# Patient Record
Sex: MALE | HISPANIC OR LATINO | Employment: FULL TIME | URBAN - METROPOLITAN AREA
[De-identification: names, ages, dates, MRNs, and addresses within clinical notes are randomized per-mention and may not be internally consistent; named-entity substitution may affect disease eponyms.]

---

## 2023-06-21 ENCOUNTER — HOSPITAL ENCOUNTER (EMERGENCY)
Facility: CLINIC | Age: 38
Discharge: HOME | End: 2023-06-22
Attending: EMERGENCY MEDICINE | Admitting: EMERGENCY MEDICINE

## 2023-06-21 VITALS
HEART RATE: 97 BPM | SYSTOLIC BLOOD PRESSURE: 113 MMHG | DIASTOLIC BLOOD PRESSURE: 83 MMHG | RESPIRATION RATE: 18 BRPM | OXYGEN SATURATION: 97 % | TEMPERATURE: 97.5 F

## 2023-06-21 DIAGNOSIS — N47.6 BALANOPOSTHITIS: ICD-10-CM

## 2023-06-21 PROCEDURE — 87591 N.GONORRHOEAE DNA AMP PROB: CPT | Performed by: EMERGENCY MEDICINE

## 2023-06-21 PROCEDURE — 99283 EMERGENCY DEPT VISIT LOW MDM: CPT

## 2023-06-21 PROCEDURE — 81001 URINALYSIS AUTO W/SCOPE: CPT | Performed by: EMERGENCY MEDICINE

## 2023-06-21 PROCEDURE — 87491 CHLMYD TRACH DNA AMP PROBE: CPT | Performed by: EMERGENCY MEDICINE

## 2023-06-22 LAB
ALBUMIN UR-MCNC: NEGATIVE MG/DL
APPEARANCE UR: CLEAR
BILIRUB UR QL STRIP: NEGATIVE
C TRACH DNA SPEC QL NAA+PROBE: NEGATIVE
COLOR UR AUTO: ABNORMAL
GLUCOSE UR STRIP-MCNC: >=1000 MG/DL
HGB UR QL STRIP: NEGATIVE
KETONES UR STRIP-MCNC: NEGATIVE MG/DL
LEUKOCYTE ESTERASE UR QL STRIP: NEGATIVE
MUCOUS THREADS #/AREA URNS LPF: PRESENT /LPF
N GONORRHOEA DNA SPEC QL NAA+PROBE: NEGATIVE
NITRATE UR QL: NEGATIVE
PH UR STRIP: 5 [PH] (ref 5–7)
RBC URINE: <1 /HPF
SP GR UR STRIP: 1.03 (ref 1–1.03)
SQUAMOUS EPITHELIAL: <1 /HPF
UROBILINOGEN UR STRIP-MCNC: NORMAL MG/DL
WBC URINE: <1 /HPF

## 2023-06-22 RX ORDER — CLOTRIMAZOLE 1 %
CREAM (GRAM) TOPICAL 2 TIMES DAILY
Qty: 14 G | Refills: 0 | Status: SHIPPED | OUTPATIENT
Start: 2023-06-22 | End: 2023-07-06

## 2023-06-22 ASSESSMENT — ACTIVITIES OF DAILY LIVING (ADL): ADLS_ACUITY_SCORE: 33

## 2023-06-22 NOTE — ED PROVIDER NOTES
History     Chief Complaint:  Penis/Scrotum Problem       A  was used (German).      Scottie Alex is an otherwise healthy 38 year old male who presents to the ED with two months of pain around the head of his penis and foreskin. Patient states that this pain began after he started having sexual intercourse with his girlfriend. He adds that the pain is worsened during and after intercourse. Scottie describes the pain as feeling like there are several small cuts on his penis and says he thinks it may be related to a vein. He endorses pain with urination as well, but he is able to urinate.      Independent Historian:   None - Patient Only    Review of External Notes:   N/A       Medications:    The patient is not currently taking any prescribed medications.    Past Medical History:    The patient denies any significant past medical history.    Physical Exam     Patient Vitals for the past 24 hrs:   BP Temp Temp src Pulse Resp SpO2   06/21/23 2308 113/83 97.5  F (36.4  C) Temporal 97 18 97 %        Physical Exam  Vitals and nursing note reviewed.   Constitutional:       General: He is not in acute distress.     Appearance: Normal appearance. He is not ill-appearing.   HENT:      Head: Normocephalic and atraumatic.      Right Ear: External ear normal.      Left Ear: External ear normal.      Nose: Nose normal.      Mouth/Throat:      Mouth: Mucous membranes are moist.   Eyes:      Extraocular Movements: Extraocular movements intact.      Conjunctiva/sclera: Conjunctivae normal.   Pulmonary:      Effort: Pulmonary effort is normal. No respiratory distress.   Genitourinary:     Penis: Uncircumcised. No phimosis or paraphimosis.       Comments: Several small superficial fissures are noted along the distal foreskin with associated tenderness and mild surrounding erythema.  Patient is able to fully retract the foreskin.  Musculoskeletal:         General: No deformity or signs of  injury.      Cervical back: Normal range of motion and neck supple.   Skin:     General: Skin is warm and dry.      Findings: No rash.   Neurological:      Mental Status: He is alert and oriented to person, place, and time.   Psychiatric:         Behavior: Behavior normal.           Emergency Department Course     Laboratory:  Labs Ordered and Resulted from Time of ED Arrival to Time of ED Departure   ROUTINE UA WITH MICROSCOPIC - Abnormal       Result Value    Color Urine Straw      Appearance Urine Clear      Glucose Urine >=1000 (*)     Bilirubin Urine Negative      Ketones Urine Negative      Specific Gravity Urine 1.035      Blood Urine Negative      pH Urine 5.0      Protein Albumin Urine Negative      Urobilinogen Urine Normal      Nitrite Urine Negative      Leukocyte Esterase Urine Negative      Mucus Urine Present (*)     RBC Urine <1      WBC Urine <1      Squamous Epithelials Urine <1     NEISSERIA GONORRHOEAE PCR   CHLAMYDIA TRACHOMATIS PCR        Emergency Department Course & Assessments:    Assessments:  0033 I obtained history and examined the patient as noted above.    Social Determinants of Health affecting care:   None    Disposition:  The patient was discharged to home.     Impression & Plan      Medical Decision Makin-year-old male presenting with pain and irritation along the distal foreskin.  Suspect that this is balanoposthitis.  Is unclear if this is an infectious etiology versus inflammatory or allergic or some sort of dermatologic etiology.  He is still able to urinate and he has no phimosis or paraphimosis.  We will try topical clotrimazole and I will refer to urology for further evaluation.  We discussed return precautions, especially if phimosis or paraphimosis should develop or if he has difficulty urinating.      Diagnosis:    ICD-10-CM    1. 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   6/22/2023   Louie Vieyra MD Goodwin, Shaun M, MD  06/22/23 0215

## 2023-06-22 NOTE — ED TRIAGE NOTES
Arrives from home. States the he had sex with his girlfriend, states he now has pain around his penis. States feels like a a lot of little cuts. Also states it is painful when he urinates. States pain is on the head of his penis.

## 2023-06-27 ENCOUNTER — OFFICE VISIT (OUTPATIENT)
Dept: UROLOGY | Facility: CLINIC | Age: 38
End: 2023-06-27

## 2023-06-27 VITALS
SYSTOLIC BLOOD PRESSURE: 132 MMHG | HEART RATE: 109 BPM | HEIGHT: 69 IN | BODY MASS INDEX: 24.44 KG/M2 | WEIGHT: 165 LBS | DIASTOLIC BLOOD PRESSURE: 72 MMHG

## 2023-06-27 DIAGNOSIS — N48.89 PENILE PAIN: Primary | ICD-10-CM

## 2023-06-27 DIAGNOSIS — N47.8 REDUNDANT PREPUCE AND PHIMOSIS: ICD-10-CM

## 2023-06-27 DIAGNOSIS — R81 GLUCOSE FOUND IN URINE ON EXAMINATION: ICD-10-CM

## 2023-06-27 DIAGNOSIS — N47.1 REDUNDANT PREPUCE AND PHIMOSIS: ICD-10-CM

## 2023-06-27 LAB
ALBUMIN UR-MCNC: NEGATIVE MG/DL
APPEARANCE UR: CLEAR
BILIRUB UR QL STRIP: NEGATIVE
COLOR UR AUTO: YELLOW
GLUCOSE UR STRIP-MCNC: >=1000 MG/DL
HGB UR QL STRIP: ABNORMAL
KETONES UR STRIP-MCNC: NEGATIVE MG/DL
LEUKOCYTE ESTERASE UR QL STRIP: NEGATIVE
NITRATE UR QL: NEGATIVE
PH UR STRIP: 5.5 [PH] (ref 5–7)
SP GR UR STRIP: 1.01 (ref 1–1.03)
UROBILINOGEN UR STRIP-ACNC: 0.2 E.U./DL

## 2023-06-27 PROCEDURE — 81003 URINALYSIS AUTO W/O SCOPE: CPT | Mod: QW | Performed by: PHYSICIAN ASSISTANT

## 2023-06-27 PROCEDURE — 99203 OFFICE O/P NEW LOW 30 MIN: CPT | Mod: 25 | Performed by: PHYSICIAN ASSISTANT

## 2023-06-27 PROCEDURE — 51798 US URINE CAPACITY MEASURE: CPT | Performed by: PHYSICIAN ASSISTANT

## 2023-06-27 RX ORDER — NYSTATIN AND TRIAMCINOLONE ACETONIDE 100000; 1 [USP'U]/G; MG/G
CREAM TOPICAL 2 TIMES DAILY
Qty: 30 G | Refills: 1 | Status: SHIPPED | OUTPATIENT
Start: 2023-06-27 | End: 2024-01-10

## 2023-06-27 ASSESSMENT — ENCOUNTER SYMPTOMS
FEVER: 0
VOMITING: 0
SHORTNESS OF BREATH: 0
FREQUENCY: 0
DYSURIA: 1
HEMATURIA: 0
CHILLS: 0
NAUSEA: 0

## 2023-06-27 ASSESSMENT — PAIN SCALES - GENERAL: PAINLEVEL: NO PAIN (0)

## 2023-06-27 NOTE — PATIENT INSTRUCTIONS
Will start with a trial of Mycolog for possible yeast and tight foreskin.  Use twice daily to the lesions and foreskin.  If is too uncomfortable, would recommend a trial of betamethasone ointment.    Stop clotrimazole when you start Mycolog.    Would recommend that you meet with a surgeon about possible circumcision and review of the penis skin in about 2 weeks.    Will plan on having you get a hemoglobin A1c to look for diabetes on the way out.    Contact us in the interim with questions, concerns, or changes in symptomatology.  [phone]

## 2023-06-27 NOTE — PROGRESS NOTES
Subjective      REQUESTING PROVIDER   Referred Self     Visit is conducted with the aid of a Uzbek iPad .    REASON FOR CONSULT   Penile soreness    HISTORY OF PRESENT ILLNESS   Mr. Alex is very pleasant 38 year old year old male, who presents today for further evaluation recommendations regarding pain at the tip of his penis and foreskin.  Patient was seen in the emergency department on 06/21/2023.  There was concern for balanoposthitis.  He was started on Lotrimin twice daily to the penis.    Patient denies hematuria, urgency, frequency, or urinary incontinence.  He feels like he empties his bladder completely.  He does note that his penis hurts with sexual intercourse as well as when he is trying to retract his foreskin.  There are is broken skin.  He is uncircumcised and notes that it is very difficult to retract the foreskin, and it gets stuck.  He does feel like the Lotrimin helped his symptoms little.    He also notes dysuria when the area of the penis is open and he is trying to urinate.    Patient was tested for gonorrhea and chlamydia.  These were both negative.  Urinalysis in the ER as well as today both shows glucose >1000.  Patient denies a diagnosis of diabetes.      The following portions of the patient's history were reviewed and updated as appropriate: allergies, current medications, past family history, past medical history, past social history, past surgical history and problem list.     REVIEW OF SYSTEMS   Review of Systems   Constitutional: Negative for chills and fever.   Respiratory: Negative for shortness of breath.    Cardiovascular: Negative for chest pain.   Gastrointestinal: Negative for nausea and vomiting.   Genitourinary: Positive for dysuria (If lesions open.), genital sores (Lesion, cracked skin.) and penile pain. Negative for frequency, hematuria and urgency.      Per HPI.     There is no problem list on file for this patient.     No past medical history on file.  "  No past surgical history on file.     Social History:   Getting .     Objective      PHYSICAL EXAM   /72   Pulse 109   Ht 1.753 m (5' 9\")   Wt 74.8 kg (165 lb)   BMI 24.37 kg/m     Physical Exam  Constitutional:       Appearance: Normal appearance.   HENT:      Head: Normocephalic.      Nose: Nose normal.   Eyes:      General: No scleral icterus.  Pulmonary:      Effort: Pulmonary effort is normal.   Abdominal:      General: There is no distension.   Genitourinary:     Comments: Uncircumcised phallus.  Longitudinal fissures along the foreskin with attempted retraction of the glans.  These are bleeding and tender.  Unable to completely retract the foreskin around the glans.  No palpable scrotal or epididymal masses bilaterally.  Cremaster reflex intact bilaterally.  Musculoskeletal:         General: Normal range of motion.      Cervical back: Normal range of motion.   Skin:     General: Skin is warm and dry.   Neurological:      General: No focal deficit present.      Mental Status: He is alert and oriented to person, place, and time.   Psychiatric:         Mood and Affect: Mood normal.         Behavior: Behavior normal.          LABORATORY     Recent Labs   Lab Test 06/27/23  1339 06/21/23  2331   COLOR Yellow Straw   APPEARANCE Clear Clear   URINEGLC >=1000* >=1000*   URINEBILI Negative Negative   URINEKETONE Negative Negative   SG 1.010 1.035   UBLD Trace* Negative   URINEPH 5.5 5.0   PROTEIN Negative Negative   UROBILINOGEN 0.2  --    NITRITE Negative Negative   LEUKEST Negative Negative   RBCU  --  <1   WBCU  --  <1       GC: Both negative.    TESTING    PVR: 11 mL    Assessment & Plan    1. Penile pain    2. Redundant prepuce and phimosis    3. Glucose found in urine on examination        I had the pleasure today of meeting with Mr. Alex to discuss the ulceration/fingers that are longitudinal on the inside of his glans when attempting to retract the foreskin.  It appears that he is " having some degree of phimosis with poorly stretching skin.  Patient does have some flaking of the skin as well.  He has noted some mild improvement with the Lotrimin.    We will plan on treating him with Mycolog twice daily to the area.  This has a steroid as well as a antifungal.  Patient will continue to try to stretch this.  If this is too irritative, would recommend a trial of betamethasone twice daily to the area.    I discussed with the patient that I am concerned that he may need a circumcision in the future.  Would recommend close follow-up with repeat examination and visit with one of our urologist to discuss this.  Patient voiced understanding.    We also discussed that his 2 recent urinalysis do show glucosuria.  I do have some concern about possible diagnosis of diabetes.  This would increase his risk of surgical intervention as well as if it is poorly controlled risk for infection.    Have recommended patient get a hemoglobin A1c at the lab on his way out to look for concern for diabetes.  We discussed long-term considerations for untreated diabetes.  Patient is very young.    Signed by:     Crystal Yeung PA-C 6/27/2023 1:36 PM

## 2023-06-27 NOTE — LETTER
6/27/2023       RE: Scottie Alex  [address]  [address]  [facility] [address]     Dear Colleague,    Thank you for referring your patient, Scottie Alex, to the [facility] [place] [facility]. Please see a copy of my visit note below.    Subjective      REQUESTING PROVIDER   Referred Self     Visit is conducted with the aid of a Romanian iPad .    REASON FOR CONSULT   Penile soreness    HISTORY OF PRESENT ILLNESS   Mr. Alex is very pleasant 38 year old year old male, who presents today for further evaluation recommendations regarding pain at the tip of his penis and foreskin.  Patient was seen in the emergency department on 06/21/2023.  There was concern for balanoposthitis.  He was started on Lotrimin twice daily to the penis.    Patient denies hematuria, urgency, frequency, or urinary incontinence.  He feels like he empties his bladder completely.  He does note that his penis hurts with sexual intercourse as well as when he is trying to retract his foreskin.  There are is broken skin.  He is uncircumcised and notes that it is very difficult to retract the foreskin, and it gets stuck.  He does feel like the Lotrimin helped his symptoms little.    He also notes dysuria when the area of the penis is open and he is trying to urinate.    Patient was tested for gonorrhea and chlamydia.  These were both negative.  Urinalysis in the ER as well as today both shows glucose >1000.  Patient denies a diagnosis of diabetes.      The following portions of the patient's history were reviewed and updated as appropriate: allergies, current medications, past family history, past medical history, past social history, past surgical history and problem list.     REVIEW OF SYSTEMS   Review of Systems   Constitutional: Negative for chills and fever.   Respiratory: Negative for shortness of breath.   "  Cardiovascular: Negative for chest pain.   Gastrointestinal: Negative for nausea and vomiting.   Genitourinary: Positive for dysuria (If lesions open.), genital sores (Lesion, cracked skin.) and penile pain. Negative for frequency, hematuria and urgency.      Per HPI.     There is no problem list on file for this patient.     No past medical history on file.   No past surgical history on file.     Social History:   Getting .     Objective      PHYSICAL EXAM   /72   Pulse 109   Ht 1.753 m (5' 9\")   Wt 74.8 kg (165 lb)   BMI 24.37 kg/m     Physical Exam  Constitutional:       Appearance: Normal appearance.   HENT:      Head: Normocephalic.      Nose: Nose normal.   Eyes:      General: No scleral icterus.  Pulmonary:      Effort: Pulmonary effort is normal.   Abdominal:      General: There is no distension.   Genitourinary:     Comments: Uncircumcised phallus.  Longitudinal fissures along the foreskin with attempted retraction of the glans.  These are bleeding and tender.  Unable to completely retract the foreskin around the glans.  No palpable scrotal or epididymal masses bilaterally.  Cremaster reflex intact bilaterally.  Musculoskeletal:         General: Normal range of motion.      Cervical back: Normal range of motion.   Skin:     General: Skin is warm and dry.   Neurological:      General: No focal deficit present.      Mental Status: He is alert and oriented to person, place, and time.   Psychiatric:         Mood and Affect: Mood normal.         Behavior: Behavior normal.          LABORATORY     Recent Labs   Lab Test 06/27/23  1339 06/21/23  2331   COLOR Yellow Straw   APPEARANCE Clear Clear   URINEGLC >=1000* >=1000*   URINEBILI Negative Negative   URINEKETONE Negative Negative   SG 1.010 1.035   UBLD Trace* Negative   URINEPH 5.5 5.0   PROTEIN Negative Negative   UROBILINOGEN 0.2  --    NITRITE Negative Negative   LEUKEST Negative Negative   RBCU  --  <1   WBCU  --  <1       GC: Both " negative.    TESTING    PVR: 11 mL    Assessment & Plan    1. Penile pain    2. Redundant prepuce and phimosis    3. Glucose found in urine on examination        I had the pleasure today of meeting with Mr. Alex to discuss the ulceration/fingers that are longitudinal on the inside of his glans when attempting to retract the foreskin.  It appears that he is having some degree of phimosis with poorly stretching skin.  Patient does have some flaking of the skin as well.  He has noted some mild improvement with the Lotrimin.    We will plan on treating him with Mycolog twice daily to the area.  This has a steroid as well as a antifungal.  Patient will continue to try to stretch this.  If this is too irritative, would recommend a trial of betamethasone twice daily to the area.    I discussed with the patient that I am concerned that he may need a circumcision in the future.  Would recommend close follow-up with repeat examination and visit with one of our urologist to discuss this.  Patient voiced understanding.    We also discussed that his 2 recent urinalysis do show glucosuria.  I do have some concern about possible diagnosis of diabetes.  This would increase his risk of surgical intervention as well as if it is poorly controlled risk for infection.    Have recommended patient get a hemoglobin A1c at the lab on his way out to look for concern for diabetes.  We discussed long-term considerations for untreated diabetes.  Patient is very young.    Signed by:     Crystal Yeung PA-C 6/27/2023 1:36 PM

## 2023-11-14 DIAGNOSIS — N48.89 PENILE PAIN: ICD-10-CM

## 2023-11-15 RX ORDER — NYSTATIN AND TRIAMCINOLONE ACETONIDE 100000; 1 [USP'U]/G; MG/G
CREAM TOPICAL 2 TIMES DAILY
Qty: 30 G | Refills: 1 | OUTPATIENT
Start: 2023-11-15

## 2023-11-15 NOTE — TELEPHONE ENCOUNTER
He should see a physician if he is still having issues. Ie needing the cream. I recommend close follow up and he no showed his appointment with Haley.  I think he might need a circumcision given how tight it was.    Crystal Yeung PA-C

## 2023-11-29 ENCOUNTER — OFFICE VISIT (OUTPATIENT)
Dept: UROLOGY | Facility: CLINIC | Age: 38
End: 2023-11-29

## 2023-11-29 VITALS
WEIGHT: 170 LBS | HEIGHT: 69 IN | DIASTOLIC BLOOD PRESSURE: 75 MMHG | BODY MASS INDEX: 25.18 KG/M2 | SYSTOLIC BLOOD PRESSURE: 112 MMHG | HEART RATE: 94 BPM

## 2023-11-29 DIAGNOSIS — N47.1 PHIMOSIS: Primary | ICD-10-CM

## 2023-11-29 PROCEDURE — 99214 OFFICE O/P EST MOD 30 MIN: CPT | Performed by: UROLOGY

## 2023-11-29 RX ORDER — CEFAZOLIN SODIUM 2 G/50ML
2 SOLUTION INTRAVENOUS SEE ADMIN INSTRUCTIONS
Status: CANCELLED | OUTPATIENT
Start: 2023-11-29

## 2023-11-29 RX ORDER — CEFAZOLIN SODIUM 2 G/50ML
2 SOLUTION INTRAVENOUS
Status: CANCELLED | OUTPATIENT
Start: 2023-11-29

## 2023-11-29 ASSESSMENT — PAIN SCALES - GENERAL: PAINLEVEL: NO PAIN (0)

## 2023-11-29 NOTE — LETTER
11/29/2023       RE: Scottie Alex  [address]  [facility] [address]     Dear Colleague,    Thank you for referring your patient, Scottie Alex, to [facility]. Please see a copy of my visit note below.    Office Visit Note  [facility]  [phone]    UROLOGIC DIAGNOSES:   Phimosis and balanitis    CURRENT INTERVENTIONS:   Steroid cream    HISTORY:   This is a 38-year-old gentleman who has had balanitis and difficulty retracting the foreskin for some time.  He has been using nystatin and steroid cream for the past 4 to 5 months and says he has perhaps seen a little improvement but not enough.  He says he still has difficulty retracting the foreskin, and that retract the foreskin is quite painful.  He continues to be quite bothered by this.      PAST MEDICAL HISTORY: History reviewed. No pertinent past medical history.    PAST SURGICAL HISTORY: History reviewed. No pertinent surgical history.    FAMILY HISTORY: History reviewed. No pertinent family history.    SOCIAL HISTORY:   Social History     Socioeconomic History    Marital status:      Spouse name: None    Number of children: None    Years of education: None    Highest education level: None   Tobacco Use    Smoking status: Every Day     Types: Cigarettes    Smokeless tobacco: Never       Review Of Systems:  Skin: No rash, pruritis, or skin pigmentation  Eyes: No changes in vision  Ears/Nose/Throat: No changes in hearing, no nosebleeds  Respiratory: No shortness of breath, dyspnea on exertion, cough, or hemoptysis  Cardiovascular: No chest pain or palpitations  Gastrointestinal: No diarrhea or constipation. No abdominal pain. No hematochezia  Genitourinary: see HPI  Musculoskeletal: No pain or swelling of joints, normal range of motion  Neurologic: No weakness or tremors  Psychiatric: No  "recent changes in memory or mood  Hematologic/Lymphatic/Immunologic: No easy bruising or enlarged lymph nodes  Endocrine: No weight gain or loss      PHYSICAL EXAM:    /75   Pulse 94   Ht 1.753 m (5' 9\")   Wt 77.1 kg (170 lb)   BMI 25.10 kg/m      Constitutional: Well developed. Conversant and in no acute distress  Eyes: Anicteric sclera, conjunctiva clear, normal extraocular movements  ENT: Normocephalic and atraumatic,   Skin: Warm and dry. No rashes or lesions  Cardiac: Heart regular rate and rhythm without murmurs rubs or gallops.  No peripheral edema  Back/Flank: Not done  CNS/PNS: Normal musculature and movements, moves all extremities normally  Respiratory: Lungs clear to auscultation bilaterally.  Normal non-labored breathing  Abdomen: Soft nontender and nondistended  Peripheral Vascular: No peripheral edema  Mental Status/Psych: Alert and Oriented x 3. Normal mood and affect    Penis: He is uncircumcised.  I was able to pull back foreskin, but it is painful for the patient.  The patient has a ring of foreskin that is quite cracked and scarred and inflamed.  He has a tethered penile frenulum as well.  Scrotal skin: Normal, no lesions  Testicles: Normal to palpation bilaterally  Epididymis: Normal to palpation bilaterally  Lymphatic: Normal inguinal lymph nodes    Digital Rectal Exam:     Cystoscopy: Not done    Imaging: None    Urinalysis: UA RESULTS:  Recent Labs   Lab Test 06/27/23  1339 06/21/23  2331   COLOR Yellow Straw   APPEARANCE Clear Clear   URINEGLC >=1000* >=1000*   URINEBILI Negative Negative   URINEKETONE Negative Negative   SG 1.010 1.035   UBLD Trace* Negative   URINEPH 5.5 5.0   PROTEIN Negative Negative   UROBILINOGEN 0.2  --    NITRITE Negative Negative   LEUKEST Negative Negative   RBCU  --  <1   WBCU  --  <1       PSA:     Post Void Residual:     Other labs: None today      IMPRESSION:  Phimosis    PLAN:  He has mild phimosis.  I am still able to retract the foreskin.  However, " the foreskin itself is quite cracked and irritated and it is painful for him to retract the foreskin.  We discussed options of continuing with steroid cream versus proceeding with circumcision.  At this time he wishes to proceed with circumcision.  We discussed circumcision in detail today along with its risks and expected recovery.  All of his questions were answered and he wishes to proceed.  Will get him scheduled for circumcision as an outpatient in the near future.        Chris Joyner M.D.

## 2023-11-29 NOTE — NURSING NOTE
Chief Complaint   Patient presents with    Phimosis      Pt states he has difficulty pulling back foreskin to clean, pt states this is painful.  Pt states he has been through 2 tubes of cream, but this has not helped    Akilah Quigley CMA

## 2023-11-29 NOTE — PROGRESS NOTES
"Office Visit Note  [facility]  [phone]    UROLOGIC DIAGNOSES:   Phimosis and balanitis    CURRENT INTERVENTIONS:   Steroid cream    HISTORY:   This is a 38-year-old gentleman who has had balanitis and difficulty retracting the foreskin for some time.  He has been using nystatin and steroid cream for the past 4 to 5 months and says he has perhaps seen a little improvement but not enough.  He says he still has difficulty retracting the foreskin, and that retract the foreskin is quite painful.  He continues to be quite bothered by this.      PAST MEDICAL HISTORY: History reviewed. No pertinent past medical history.    PAST SURGICAL HISTORY: History reviewed. No pertinent surgical history.    FAMILY HISTORY: History reviewed. No pertinent family history.    SOCIAL HISTORY:   Social History     Socioeconomic History    Marital status:      Spouse name: None    Number of children: None    Years of education: None    Highest education level: None   Tobacco Use    Smoking status: Every Day     Types: Cigarettes    Smokeless tobacco: Never       Review Of Systems:  Skin: No rash, pruritis, or skin pigmentation  Eyes: No changes in vision  Ears/Nose/Throat: No changes in hearing, no nosebleeds  Respiratory: No shortness of breath, dyspnea on exertion, cough, or hemoptysis  Cardiovascular: No chest pain or palpitations  Gastrointestinal: No diarrhea or constipation. No abdominal pain. No hematochezia  Genitourinary: see HPI  Musculoskeletal: No pain or swelling of joints, normal range of motion  Neurologic: No weakness or tremors  Psychiatric: No recent changes in memory or mood  Hematologic/Lymphatic/Immunologic: No easy bruising or enlarged lymph nodes  Endocrine: No weight gain or loss      PHYSICAL EXAM:    /75   Pulse 94   Ht 1.753 m (5' 9\")   Wt 77.1 kg (170 lb)   BMI 25.10 kg/m      Constitutional: Well developed. Conversant and in no acute distress  Eyes: Anicteric sclera, " conjunctiva clear, normal extraocular movements  ENT: Normocephalic and atraumatic,   Skin: Warm and dry. No rashes or lesions  Cardiac: Heart regular rate and rhythm without murmurs rubs or gallops.  No peripheral edema  Back/Flank: Not done  CNS/PNS: Normal musculature and movements, moves all extremities normally  Respiratory: Lungs clear to auscultation bilaterally.  Normal non-labored breathing  Abdomen: Soft nontender and nondistended  Peripheral Vascular: No peripheral edema  Mental Status/Psych: Alert and Oriented x 3. Normal mood and affect    Penis: He is uncircumcised.  I was able to pull back foreskin, but it is painful for the patient.  The patient has a ring of foreskin that is quite cracked and scarred and inflamed.  He has a tethered penile frenulum as well.  Scrotal skin: Normal, no lesions  Testicles: Normal to palpation bilaterally  Epididymis: Normal to palpation bilaterally  Lymphatic: Normal inguinal lymph nodes    Digital Rectal Exam:     Cystoscopy: Not done    Imaging: None    Urinalysis: UA RESULTS:  Recent Labs   Lab Test 06/27/23  1339 06/21/23  2331   COLOR Yellow Straw   APPEARANCE Clear Clear   URINEGLC >=1000* >=1000*   URINEBILI Negative Negative   URINEKETONE Negative Negative   SG 1.010 1.035   UBLD Trace* Negative   URINEPH 5.5 5.0   PROTEIN Negative Negative   UROBILINOGEN 0.2  --    NITRITE Negative Negative   LEUKEST Negative Negative   RBCU  --  <1   WBCU  --  <1       PSA:     Post Void Residual:     Other labs: None today      IMPRESSION:  Phimosis    PLAN:  He has mild phimosis.  I am still able to retract the foreskin.  However, the foreskin itself is quite cracked and irritated and it is painful for him to retract the foreskin.  We discussed options of continuing with steroid cream versus proceeding with circumcision.  At this time he wishes to proceed with circumcision.  We discussed circumcision in detail today along with its risks and expected recovery.  All of his  questions were answered and he wishes to proceed.  Will get him scheduled for circumcision as an outpatient in the near future.        Chris Joyner M.D.

## 2023-11-30 ENCOUNTER — TELEPHONE (OUTPATIENT)
Dept: UROLOGY | Facility: CLINIC | Age: 38
End: 2023-11-30

## 2023-11-30 NOTE — TELEPHONE ENCOUNTER
Scheduled surgery for pt with Dr. Joyner 11/27. Went through instructions when he was in the office yesterday, sent him home with packet. Dr. Joyner said he already did the pre-op in the clinic, so no need to schedule pre-op.

## 2024-01-09 ENCOUNTER — APPOINTMENT (OUTPATIENT)
Dept: INTERPRETER SERVICES | Facility: CLINIC | Age: 39
End: 2024-01-09

## 2024-01-10 ENCOUNTER — APPOINTMENT (OUTPATIENT)
Dept: INTERPRETER SERVICES | Facility: CLINIC | Age: 39
End: 2024-01-10

## 2024-01-17 ENCOUNTER — ANESTHESIA (OUTPATIENT)
Dept: SURGERY | Facility: CLINIC | Age: 39
End: 2024-01-17

## 2024-01-17 ENCOUNTER — HOSPITAL ENCOUNTER (OUTPATIENT)
Facility: CLINIC | Age: 39
Discharge: HOME | End: 2024-01-17
Attending: UROLOGY | Admitting: UROLOGY

## 2024-01-17 ENCOUNTER — ANESTHESIA EVENT (OUTPATIENT)
Dept: SURGERY | Facility: CLINIC | Age: 39
End: 2024-01-17

## 2024-01-17 VITALS
TEMPERATURE: 98 F | WEIGHT: 152.5 LBS | HEART RATE: 86 BPM | BODY MASS INDEX: 22.52 KG/M2 | DIASTOLIC BLOOD PRESSURE: 67 MMHG | RESPIRATION RATE: 15 BRPM | SYSTOLIC BLOOD PRESSURE: 114 MMHG | OXYGEN SATURATION: 99 %

## 2024-01-17 DIAGNOSIS — N47.1 PHIMOSIS: Primary | ICD-10-CM

## 2024-01-17 PROCEDURE — 250N000011 HC RX IP 250 OP 636: Performed by: UROLOGY

## 2024-01-17 PROCEDURE — 258N000003 HC RX IP 258 OP 636: Performed by: NURSE ANESTHETIST, CERTIFIED REGISTERED

## 2024-01-17 PROCEDURE — 272N000001 HC OR GENERAL SUPPLY STERILE: Performed by: UROLOGY

## 2024-01-17 PROCEDURE — 250N000009 HC RX 250: Performed by: UROLOGY

## 2024-01-17 PROCEDURE — 710N000012 HC RECOVERY PHASE 2, PER MINUTE: Performed by: UROLOGY

## 2024-01-17 PROCEDURE — 250N000011 HC RX IP 250 OP 636: Performed by: NURSE ANESTHETIST, CERTIFIED REGISTERED

## 2024-01-17 PROCEDURE — 370N000017 HC ANESTHESIA TECHNICAL FEE, PER MIN: Performed by: UROLOGY

## 2024-01-17 PROCEDURE — 999N000141 HC STATISTIC PRE-PROCEDURE NURSING ASSESSMENT: Performed by: UROLOGY

## 2024-01-17 PROCEDURE — 88304 TISSUE EXAM BY PATHOLOGIST: CPT | Mod: 26 | Performed by: PATHOLOGY

## 2024-01-17 PROCEDURE — 88304 TISSUE EXAM BY PATHOLOGIST: CPT | Mod: TC | Performed by: UROLOGY

## 2024-01-17 PROCEDURE — 360N000076 HC SURGERY LEVEL 3, PER MIN: Performed by: UROLOGY

## 2024-01-17 RX ORDER — GINSENG 100 MG
CAPSULE ORAL PRN
Status: DISCONTINUED | OUTPATIENT
Start: 2024-01-17 | End: 2024-01-17 | Stop reason: HOSPADM

## 2024-01-17 RX ORDER — CEFAZOLIN SODIUM/WATER 2 G/20 ML
2 SYRINGE (ML) INTRAVENOUS SEE ADMIN INSTRUCTIONS
Status: DISCONTINUED | OUTPATIENT
Start: 2024-01-17 | End: 2024-01-17 | Stop reason: HOSPADM

## 2024-01-17 RX ORDER — ONDANSETRON 4 MG/1
4 TABLET, ORALLY DISINTEGRATING ORAL EVERY 30 MIN PRN
Status: DISCONTINUED | OUTPATIENT
Start: 2024-01-17 | End: 2024-01-17 | Stop reason: HOSPADM

## 2024-01-17 RX ORDER — BUPIVACAINE HYDROCHLORIDE 5 MG/ML
INJECTION, SOLUTION EPIDURAL; INTRACAUDAL PRN
Status: DISCONTINUED | OUTPATIENT
Start: 2024-01-17 | End: 2024-01-17 | Stop reason: HOSPADM

## 2024-01-17 RX ORDER — OXYCODONE HYDROCHLORIDE 5 MG/1
10 TABLET ORAL
Status: DISCONTINUED | OUTPATIENT
Start: 2024-01-17 | End: 2024-01-17 | Stop reason: HOSPADM

## 2024-01-17 RX ORDER — FENTANYL CITRATE 50 UG/ML
INJECTION, SOLUTION INTRAMUSCULAR; INTRAVENOUS PRN
Status: DISCONTINUED | OUTPATIENT
Start: 2024-01-17 | End: 2024-01-17

## 2024-01-17 RX ORDER — CEFAZOLIN SODIUM/WATER 2 G/20 ML
2 SYRINGE (ML) INTRAVENOUS
Status: DISCONTINUED | OUTPATIENT
Start: 2024-01-17 | End: 2024-01-17 | Stop reason: HOSPADM

## 2024-01-17 RX ORDER — ONDANSETRON 2 MG/ML
INJECTION INTRAMUSCULAR; INTRAVENOUS PRN
Status: DISCONTINUED | OUTPATIENT
Start: 2024-01-17 | End: 2024-01-17

## 2024-01-17 RX ORDER — FENTANYL CITRATE 50 UG/ML
25 INJECTION, SOLUTION INTRAMUSCULAR; INTRAVENOUS
Status: DISCONTINUED | OUTPATIENT
Start: 2024-01-17 | End: 2024-01-17 | Stop reason: HOSPADM

## 2024-01-17 RX ORDER — SODIUM CHLORIDE, SODIUM LACTATE, POTASSIUM CHLORIDE, CALCIUM CHLORIDE 600; 310; 30; 20 MG/100ML; MG/100ML; MG/100ML; MG/100ML
INJECTION, SOLUTION INTRAVENOUS CONTINUOUS PRN
Status: DISCONTINUED | OUTPATIENT
Start: 2024-01-17 | End: 2024-01-17

## 2024-01-17 RX ORDER — ONDANSETRON 2 MG/ML
4 INJECTION INTRAMUSCULAR; INTRAVENOUS EVERY 30 MIN PRN
Status: DISCONTINUED | OUTPATIENT
Start: 2024-01-17 | End: 2024-01-17 | Stop reason: HOSPADM

## 2024-01-17 RX ORDER — NALOXONE HYDROCHLORIDE 0.4 MG/ML
0.4 INJECTION, SOLUTION INTRAMUSCULAR; INTRAVENOUS; SUBCUTANEOUS
Status: DISCONTINUED | OUTPATIENT
Start: 2024-01-17 | End: 2024-01-17 | Stop reason: HOSPADM

## 2024-01-17 RX ORDER — KETOROLAC TROMETHAMINE 30 MG/ML
INJECTION, SOLUTION INTRAMUSCULAR; INTRAVENOUS PRN
Status: DISCONTINUED | OUTPATIENT
Start: 2024-01-17 | End: 2024-01-17

## 2024-01-17 RX ORDER — PROPOFOL 10 MG/ML
INJECTION, EMULSION INTRAVENOUS CONTINUOUS PRN
Status: DISCONTINUED | OUTPATIENT
Start: 2024-01-17 | End: 2024-01-17

## 2024-01-17 RX ORDER — HYDROCODONE BITARTRATE AND ACETAMINOPHEN 5; 325 MG/1; MG/1
1 TABLET ORAL EVERY 6 HOURS PRN
Qty: 5 TABLET | Refills: 0 | Status: SHIPPED | OUTPATIENT
Start: 2024-01-17

## 2024-01-17 RX ORDER — CEFAZOLIN SODIUM 1 G/3ML
INJECTION, POWDER, FOR SOLUTION INTRAMUSCULAR; INTRAVENOUS PRN
Status: DISCONTINUED | OUTPATIENT
Start: 2024-01-17 | End: 2024-01-17

## 2024-01-17 RX ORDER — NALOXONE HYDROCHLORIDE 0.4 MG/ML
0.2 INJECTION, SOLUTION INTRAMUSCULAR; INTRAVENOUS; SUBCUTANEOUS
Status: DISCONTINUED | OUTPATIENT
Start: 2024-01-17 | End: 2024-01-17 | Stop reason: HOSPADM

## 2024-01-17 RX ORDER — DEXAMETHASONE SODIUM PHOSPHATE 4 MG/ML
INJECTION, SOLUTION INTRA-ARTICULAR; INTRALESIONAL; INTRAMUSCULAR; INTRAVENOUS; SOFT TISSUE PRN
Status: DISCONTINUED | OUTPATIENT
Start: 2024-01-17 | End: 2024-01-17

## 2024-01-17 RX ORDER — OXYCODONE HYDROCHLORIDE 5 MG/1
5 TABLET ORAL
Status: DISCONTINUED | OUTPATIENT
Start: 2024-01-17 | End: 2024-01-17 | Stop reason: HOSPADM

## 2024-01-17 RX ADMIN — ONDANSETRON 4 MG: 2 INJECTION INTRAMUSCULAR; INTRAVENOUS at 12:23

## 2024-01-17 RX ADMIN — PHENYLEPHRINE HYDROCHLORIDE 100 MCG: 10 INJECTION INTRAVENOUS at 12:42

## 2024-01-17 RX ADMIN — PROPOFOL 150 MCG/KG/MIN: 10 INJECTION, EMULSION INTRAVENOUS at 12:08

## 2024-01-17 RX ADMIN — MIDAZOLAM 2 MG: 1 INJECTION INTRAMUSCULAR; INTRAVENOUS at 12:08

## 2024-01-17 RX ADMIN — FENTANYL CITRATE 25 MCG: 50 INJECTION INTRAMUSCULAR; INTRAVENOUS at 12:18

## 2024-01-17 RX ADMIN — FENTANYL CITRATE 25 MCG: 50 INJECTION INTRAMUSCULAR; INTRAVENOUS at 12:12

## 2024-01-17 RX ADMIN — DEXAMETHASONE SODIUM PHOSPHATE 4 MG: 4 INJECTION, SOLUTION INTRA-ARTICULAR; INTRALESIONAL; INTRAMUSCULAR; INTRAVENOUS; SOFT TISSUE at 12:23

## 2024-01-17 RX ADMIN — CEFAZOLIN 2 G: 1 INJECTION, POWDER, FOR SOLUTION INTRAMUSCULAR; INTRAVENOUS at 12:08

## 2024-01-17 RX ADMIN — PHENYLEPHRINE HYDROCHLORIDE 100 MCG: 10 INJECTION INTRAVENOUS at 12:45

## 2024-01-17 RX ADMIN — FENTANYL CITRATE 50 MCG: 50 INJECTION INTRAMUSCULAR; INTRAVENOUS at 12:30

## 2024-01-17 RX ADMIN — SODIUM CHLORIDE, POTASSIUM CHLORIDE, SODIUM LACTATE AND CALCIUM CHLORIDE: 600; 310; 30; 20 INJECTION, SOLUTION INTRAVENOUS at 12:08

## 2024-01-17 RX ADMIN — KETOROLAC TROMETHAMINE 15 MG: 30 INJECTION, SOLUTION INTRAMUSCULAR at 12:48

## 2024-01-17 ASSESSMENT — ACTIVITIES OF DAILY LIVING (ADL)
ADLS_ACUITY_SCORE: 29

## 2024-01-17 ASSESSMENT — LIFESTYLE VARIABLES: TOBACCO_USE: 1

## 2024-01-17 NOTE — ANESTHESIA CARE TRANSFER NOTE
Patient: Scottie Alex    Procedure: Procedure(s):  Circumcision       Diagnosis: Phimosis [N47.1]  Diagnosis Additional Information: No value filed.    Anesthesia Type:   MAC     Note:    Oropharynx: oropharynx clear of all foreign objects and spontaneously breathing  Level of Consciousness: awake  Oxygen Supplementation: room air    Independent Airway: airway patency satisfactory and stable  Dentition: dentition unchanged  Vital Signs Stable: post-procedure vital signs reviewed and stable  Report to RN Given: handoff report given  Patient transferred to: Phase II    Handoff Report: Identifed the Patient, Identified the Reponsible Provider, Reviewed the pertinent medical history, Discussed the surgical course, Reviewed Intra-OP anesthesia mangement and issues during anesthesia, Set expectations for post-procedure period and Allowed opportunity for questions and acknowledgement of understanding      Vitals:  Vitals Value Taken Time   BP     Temp     Pulse     Resp     SpO2         Electronically Signed By: CARLI Prado CRNA  January 17, 2024  12:59 PM

## 2024-01-17 NOTE — ANESTHESIA PREPROCEDURE EVALUATION
"Anesthesia Pre-Procedure Evaluation    Patient: Scottie Alex   MRN: [id number] : 1985        Procedure : Procedure(s):  Circumcision          History reviewed. No pertinent past medical history.   History reviewed. No pertinent surgical history.   No Known Allergies   Social History     Tobacco Use    Smoking status: Every Day     Packs/day: .5     Types: Cigarettes    Smokeless tobacco: Never   Substance Use Topics    Alcohol use: Yes     Comment: weekends      Wt Readings from Last 1 Encounters:   24 69.2 kg (152 lb 8 oz)        Anesthesia Evaluation            ROS/MED HX  ENT/Pulmonary:     (+)                tobacco use, Current use, 0.5 packs/day,  patient smoked within 24 hours,                    Neurologic:  - neg neurologic ROS     Cardiovascular:  - neg cardiovascular ROS     METS/Exercise Tolerance:     Hematologic: Comments: No lab results found.   No lab results found.        Musculoskeletal:  - neg musculoskeletal ROS     GI/Hepatic:  - neg GI/hepatic ROS     Renal/Genitourinary: Comment: Phimosis - neg Renal ROS     Endo:  - neg endo ROS     Psychiatric/Substance Use:  - neg psychiatric ROS     Infectious Disease:  - neg infectious disease ROS     Malignancy:  - neg malignancy ROS     Other:  - neg other ROS          Physical Exam    Airway        Mallampati: II   TM distance: > 3 FB   Neck ROM: full   Mouth opening: > 3 cm    Respiratory Devices and Support         Dental       (+) Minor Abnormalities - some fillings, tiny chips      Cardiovascular   cardiovascular exam normal          Pulmonary   pulmonary exam normal                OUTSIDE LABS:  CBC: No results found for: \"WBC\", \"HGB\", \"HCT\", \"PLT\"  BMP: No results found for: \"NA\", \"POTASSIUM\", \"CHLORIDE\", \"CO2\", \"BUN\", \"CR\", \"GLC\"  COAGS: No results found for: \"PTT\", \"INR\", \"FIBR\"  POC: No results found for: \"BGM\", \"HCG\", \"HCGS\"  HEPATIC: No results found for: \"ALBUMIN\", \"PROTTOTAL\", \"ALT\", \"AST\", \"GGT\", \"ALKPHOS\", " "\"BILITOTAL\", \"BILIDIRECT\", \"NIGEL\"  OTHER: No results found for: \"PH\", \"LACT\", \"A1C\", \"BRENNA\", \"PHOS\", \"MAG\", \"LIPASE\", \"AMYLASE\", \"TSH\", \"T4\", \"T3\", \"CRP\", \"SED\"    Anesthesia Plan    ASA Status:  2       Anesthesia Type: MAC.     - Reason for MAC: immobility needed   Induction: Propofol.   Maintenance: TIVA.        Consents    Anesthesia Plan(s) and associated risks, benefits, and realistic alternatives discussed. Questions answered and patient/representative(s) expressed understanding.     - Discussed:     - Discussed with:  Patient      - Extended Intubation/Ventilatory Support Discussed: No.      - Patient is DNR/DNI Status: No     Use of blood products discussed: No .     Postoperative Care    Pain management: IV analgesics.   PONV prophylaxis: Dexamethasone or Solumedrol, Ondansetron (or other 5HT-3)     Comments:               Richie Lucia MD    I have reviewed the pertinent notes and labs in the chart from the past 30 days and (re)examined the patient.  Any updates or changes from those notes are reflected in this note.                  "

## 2024-01-17 NOTE — ANESTHESIA POSTPROCEDURE EVALUATION
Patient: Scottie Alex    Procedure: Procedure(s):  Circumcision       Anesthesia Type:  MAC    Note:  Disposition: Outpatient   Postop Pain Control: Uneventful            Sign Out: Well controlled pain   PONV: No   Neuro/Psych: Uneventful            Sign Out: Acceptable/Baseline neuro status   Airway/Respiratory: Uneventful            Sign Out: Acceptable/Baseline resp. status   CV/Hemodynamics: Uneventful            Sign Out: Acceptable CV status; No obvious hypovolemia; No obvious fluid overload   Other NRE: NONE   DID A NON-ROUTINE EVENT OCCUR? No           Last vitals:  Vitals Value Taken Time   /82 01/17/24 1345   Temp 97.7  F (36.5  C) 01/17/24 1302   Pulse 86 01/17/24 1345   Resp 16 01/17/24 1345   SpO2 99 % 01/17/24 1347   Vitals shown include unfiled device data.    Electronically Signed By: Richie Lucia MD  January 17, 2024  2:04 PM

## 2024-01-17 NOTE — PROVIDER NOTIFICATION
Bloody drainage noted on posterior aspect of glans as well as what appears to be a skin tag.  Dr. Joyner notified and at bedside to evaluate.  Per surgeon, drainage is normal and tag is likely from the local that was injected.  Surgeon stated pt may use gauze as needed to absorb drainage.

## 2024-01-17 NOTE — OP NOTE
SURGEON:  Chris Joyner MD     PREOPERATIVE DIAGNOSIS: Phimosis     POSTOPERATIVE DIAGNOSIS: Phimosis     PROCEDURE PERFORMED: Circumcision     ANESTHESIA:  General.     COMPLICATIONS:  None     INDICATIONS FOR PROCEDURE: This is a 38 year old gentleman with phimosis who presents for circumcision    DETAILS OF PROCEDURE: The patient was brought to the operating room and placed supine on the operative table and underwent anesthetic.  The penis was prepped and draped in the standard sterile fashion.    I began by performing a dorsal penile block using plain lidocaine local anesthetic.    I then incision in the phimotic ring of foreskin so that I could deliver the glans penis through the foreskin.  I then prepped the glans penis on the field with further Betadine.  I then held the penis out on stretch and made a circumcising incision circumferentially 1 cm proximal to the coronal margin.  I skeletonized the penis at the level of Hinton's fascia.  I then made a dorsal incision to the appropriate length in the 12 o'clock position and then I tacked the skin edges together in the 12 o'clock position with a 3-0 chromic suture.  I then made a ventral incision to the appropriate length in the 6 o'clock position and tacked the skin edges together 6 o'clock position with a 3-0 chromic suture.  The excess foreskin wings were then removed using Bovie electrocautery.  I did a search for bleeding and ensure proper hemostasis with Bovie electrocautery.  I then reapproximated skin edges circumferentially with interrupted 3-0 chromic sutures.    I dressed the penis with bacitracin ointment covered by clean gauze and then covered by Coban gauze.  The procedure was concluded at this point.    The patient tolerated the procedure well without complication.  He went to the postanesthetic care unit in good condition and he will discharge home from there.  He will remove his own dressing at home tomorrow morning.

## 2024-01-17 NOTE — DISCHARGE INSTRUCTIONS
DR. PRASHANT JOYNER M.D.  CLINIC PHONE NUMBER:  [phone]  [facility]     You received Toradol, an IV form of Ibuprofen (Motrin) at 12:45 pm.  Do not take any Ibuprofen products until 6:45 pm.     Maximum acetaminophen (Tylenol) dose from all sources should not exceed 4 grams (4000 mg) per day.    DR. PRASHANT JOYNER M.D.  CLINIC PHONE NUMBER:  [phone]  [facility]      Per Dr. Joyner's Orders  Patient is to remove dressing tomorrow AM  No need to re-dress after that  OK to shower starting tomorrow

## 2024-01-18 LAB
PATH REPORT.COMMENTS IMP SPEC: NORMAL
PATH REPORT.COMMENTS IMP SPEC: NORMAL
PATH REPORT.FINAL DX SPEC: NORMAL
PATH REPORT.GROSS SPEC: NORMAL
PATH REPORT.MICROSCOPIC SPEC OTHER STN: NORMAL
PATH REPORT.RELEVANT HX SPEC: NORMAL
PHOTO IMAGE: NORMAL

## 2024-01-19 ENCOUNTER — TELEPHONE (OUTPATIENT)
Dept: UROLOGY | Facility: CLINIC | Age: 39
End: 2024-01-19

## 2024-01-19 NOTE — TELEPHONE ENCOUNTER
----- Message from Chris Joyner MD sent at 1/17/2024  1:03 PM CST -----  See me in May for exam in clinic

## 2024-02-02 ENCOUNTER — APPOINTMENT (OUTPATIENT)
Dept: INTERPRETER SERVICES | Facility: CLINIC | Age: 39
End: 2024-02-02

## 2024-02-02 ENCOUNTER — TELEPHONE (OUTPATIENT)
Dept: UROLOGY | Facility: CLINIC | Age: 39
End: 2024-02-02

## 2024-03-05 ENCOUNTER — APPOINTMENT (OUTPATIENT)
Dept: INTERPRETER SERVICES | Facility: CLINIC | Age: 39
End: 2024-03-05

## 2024-03-05 ENCOUNTER — TELEPHONE (OUTPATIENT)
Dept: UROLOGY | Facility: CLINIC | Age: 39
End: 2024-03-05

## 2024-03-05 NOTE — TELEPHONE ENCOUNTER
----- Message from Chris Joyner MD sent at 1/17/2024  1:03 PM CST -----  See me in May for exam in clinic    3/5/24  left message. Letter sent and message doned. C   2/2 LVM via  david   1/19 lvm via  JAEL

## (undated) DEVICE — SU CHROMIC 3-0 PS-2 27" 1638H

## (undated) DEVICE — DRAPE LAP PEDS DISP 29492

## (undated) DEVICE — PREP POVIDONE-IODINE 7.5% SCRUB 4OZ BOTTLE MDS093945

## (undated) DEVICE — GLOVE BIOGEL PI ULTRATOUCH G SZ 7.5 42175

## (undated) DEVICE — ESU ELEC NDL 1" COATED/INSULATED E1465

## (undated) DEVICE — BNDG KLING 2" 2231

## (undated) DEVICE — LINEN TOWEL PACK X10 5473

## (undated) DEVICE — SOL NACL 0.9% IRRIG 1000ML BOTTLE 2F7124

## (undated) DEVICE — LINEN HALF SHEET 5512

## (undated) DEVICE — LINEN FULL SHEET 5511

## (undated) DEVICE — BAG CLEAR TRASH 1.3M 39X33" P4040C

## (undated) DEVICE — ESU GROUND PAD ADULT W/CORD E7507

## (undated) DEVICE — PREP POVIDONE IODINE SOLUTION 10% 4OZ BOTTLE 29906-004

## (undated) DEVICE — LINEN TOWEL PACK X5 5464

## (undated) DEVICE — BNDG COBAN 1"X5YDS UNSTERILE

## (undated) DEVICE — PACK MINOR CUSTOM RIDGES SBA32RMRMA

## (undated) DEVICE — TRAY PREP DRY SKIN SCRUB 067

## (undated) RX ORDER — BUPIVACAINE HYDROCHLORIDE 5 MG/ML
INJECTION, SOLUTION EPIDURAL; INTRACAUDAL
Status: DISPENSED
Start: 2024-01-17

## (undated) RX ORDER — CEFAZOLIN SODIUM/WATER 2 G/20 ML
SYRINGE (ML) INTRAVENOUS
Status: DISPENSED
Start: 2024-01-17

## (undated) RX ORDER — GINSENG 100 MG
CAPSULE ORAL
Status: DISPENSED
Start: 2024-01-17

## (undated) RX ORDER — FENTANYL CITRATE 50 UG/ML
INJECTION, SOLUTION INTRAMUSCULAR; INTRAVENOUS
Status: DISPENSED
Start: 2024-01-17